# Patient Record
Sex: FEMALE | Race: WHITE | NOT HISPANIC OR LATINO | Employment: OTHER | URBAN - METROPOLITAN AREA
[De-identification: names, ages, dates, MRNs, and addresses within clinical notes are randomized per-mention and may not be internally consistent; named-entity substitution may affect disease eponyms.]

---

## 2020-05-13 ENCOUNTER — LAB VISIT (OUTPATIENT)
Dept: PRIMARY CARE CLINIC | Facility: CLINIC | Age: 75
End: 2020-05-13
Payer: MEDICARE

## 2020-05-13 ENCOUNTER — RESEARCH ENCOUNTER (OUTPATIENT)
Dept: RESEARCH | Facility: HOSPITAL | Age: 75
End: 2020-05-13

## 2020-05-13 DIAGNOSIS — Z00.6 RESEARCH STUDY PATIENT: Primary | ICD-10-CM

## 2020-05-13 LAB — SARS-COV-2 IGG SERPLBLD QL IA.RAPID: NEGATIVE

## 2020-05-13 PROCEDURE — U0002 COVID-19 LAB TEST NON-CDC: HCPCS

## 2020-05-13 PROCEDURE — 86769 SARS-COV-2 COVID-19 ANTIBODY: CPT

## 2020-05-13 NOTE — PROGRESS NOTES
Date of Consent: may 13, 2020    Sponsor: [facility]    Study Title/IRB Number: Observational study of Sars-CoV2 Immunoglobulin G (IgG) seroprevalence among the [facility] population over time 2020.163  Principle Investigator: Lori Chauhan, PhD    Did the patient need translation services? No   name: N/A    Prior to the Informed Consent (IC) being signed, or any study protocol required data collection, testing, procedure, or intervention being performed, the following was done and/or discussed:   Patient was given a paper copy of the IC for review    Patient was given study FAQ   Purpose of the study and qualifications to participate    Study design and tests or procedures done at this visit   Confidentiality and HIPAA Authorization for Release of Medical Records for the research trial/ subject's rights/research related injury   Risk, Benefits, Alternative Treatments, Compensation and Costs   Participation in the research trial is voluntary and patient may withdraw at anytime   Contact information for study related questions    Patient verbalizes understanding of the above: Yes  Contact information for PI and IRB given to patient: Yes  Patient able to adequately summarize: the purpose of the study, the risks associated with the study, and all procedures, testing, and follow-ups associated with the study: Yes    The consent was discussed verbally with the patient and all questions were answered satisfactorily. Patient gave verbal consent for the Seroprevalence research study with an IRB approval date of 05/08/2020.      The Consent, Consent Witness and name of Clinical Research Coordinator consenting was captured and documented in [employer]Cap.    All Inclusion and Exclusion Criteria reviewed, subject meets all Inclusion criteria and does not meet any Exclusion Criteria at this time.     Patient Eligibility was confirmed.    Patient responded to survey questions.    The following biospecimen  collection procedures were collected:    -Nasopharyngeal Swab Collection  -Blood collection

## 2020-05-15 LAB — SARS-COV-2 RNA RESP QL NAA+PROBE: NOT DETECTED
